# Patient Record
Sex: FEMALE | Race: WHITE | NOT HISPANIC OR LATINO | URBAN - METROPOLITAN AREA
[De-identification: names, ages, dates, MRNs, and addresses within clinical notes are randomized per-mention and may not be internally consistent; named-entity substitution may affect disease eponyms.]

---
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
## 2024-01-01 NOTE — ASU PATIENT PROFILE, PEDIATRIC - NSSUBSTANCEUSE_GEN_ALL_CORE_SD
- may have an elongated or misshapen head.  The head is shaped according to the birth canal for easier birth.  This is called molding of the head and will round out in a few days. never used

## 2024-02-23 NOTE — ASU PATIENT PROFILE, PEDIATRIC - TEACHING/LEARNING FACTORS IMPACT ABILITY TO LEARN PEDS
Patient c/o N/V/D and upper mid abdominal pain. Patient also reporting fatigue and body chills. Went to  and was told to come to ER. Patient denies new foods. Denies sick contacts.    none